# Patient Record
Sex: MALE | Race: WHITE | NOT HISPANIC OR LATINO | Employment: OTHER | URBAN - METROPOLITAN AREA
[De-identification: names, ages, dates, MRNs, and addresses within clinical notes are randomized per-mention and may not be internally consistent; named-entity substitution may affect disease eponyms.]

---

## 2017-01-13 ENCOUNTER — DOCUMENTATION ONLY (OUTPATIENT)
Dept: NEUROLOGY | Facility: CLINIC | Age: 42
End: 2017-01-13

## 2017-01-13 NOTE — PROGRESS NOTES
This office note has been dictated.  DATE OF PROCEDURE:  12/22/2016    [facility] EEG.    PATIENT OF:  Ingrid Mackay N.P.    CLINICAL INFORMATION:  Seizure disorder.    EEG FINDINGS:  The resting background is a well-developed semi-rhythmical 10   cycle per second posterior alpha rhythm.  Lower voltages, faster frequencies,   some muscle and eye movement artifacts are noted more anteriorly when the   patient is fully alert.  Drowsiness is seen with desynchronization of the   background and light sleep follows with vertex transients and frontal central   spindle activity.  Activation procedures do not significantly alter the record.    There are no sustained tachy or bradyarrhythmias seen on the EKG channel   recording.    IMPRESSION:  This is a normal waking and sleeping EEG.    CLINICAL IMPLICATION:  This record fails to give evidence of cerebral electrical   dysfunction.      FSO/HN  dd: 01/13/2017 12:32:51 (CST)  td: 01/13/2017 13:13:37 (CST)  Doc ID   #[id number]  Job ID #[id number]    CC:     Dictation # [id number]